# Patient Record
(demographics unavailable — no encounter records)

---

## 2024-10-15 NOTE — OB SUMMARY
[Date: _____] : Date: [unfilled] [Gestational Age: _____] : Gestational Age: [unfilled] [FreeTextEntry2] : siup at 38 wks  Patient complain  some itching on and off on hands and body no palms of hands and feet irregular ctx lft and bile salts fractionated rto 1 week  [FreeTextEntry1] : Pt is here for her 38 week OB check-up, pt has no complaints today.  wt- 166 ht- 5 '4 b/p- 106/69 pulse- 71 temp- 98.6 pro- neg glu- neg  [de-identified] : sm

## 2024-10-25 NOTE — DISCUSSION/SUMMARY
[FreeTextEntry1] : 23-year-old  1 para 0 at term. She feels well and the baby is active. BPP today 10/10

## 2024-10-29 NOTE — OB SUMMARY
[Date: _____] : Date: [unfilled] [Gestational Age: _____] : Gestational Age: [unfilled] [FreeTextEntry2] : siup at 40 wks  ve 1/long/high efw ant, no previa , vtx 6'15  16 % iol friday night [FreeTextEntry1] : Patient is here for follow/up pregnancy weight-  165 B/P-    125/78pulse-  65  urine , glucose-  protein-  negative  [de-identified] : go

## 2024-10-29 NOTE — OB SUMMARY
[Date: _____] : Date: [unfilled] [Gestational Age: _____] : Gestational Age: [unfilled] [FreeTextEntry2] : siup at 40 wks  ve 1/long/high efw ant, no previa , vtx 6'15  16 % iol friday night [FreeTextEntry1] : Patient is here for follow/up pregnancy weight-  165 B/P-    125/78pulse-  65  urine , glucose-  protein-  negative  [de-identified] : go COUGH

## 2024-12-12 NOTE — HISTORY OF PRESENT ILLNESS
[Last Pap Date: ___] : Last Pap Date: [unfilled] [Delivery Date: ___] : on [unfilled] [] : delivered by vaginal delivery [Male] : Delivery History: baby boy [Wt. ___] : weighing [unfilled] [Breastfeeding] : currently nursing [Postpartum Follow Up] : postpartum follow up [Complications:___] : complications include: [unfilled] [Fatigue] : fatigue [Back to Normal] : is back to normal in size [Normal] : the vagina was normal [Healing Well] : is healing well [Cervix Sample Taken] : cervical sample not taken for a Pap smear [Not Done] : Examination of breasts not done [Doing Well] : is doing well [None] : None [FreeTextEntry8] : s/p  , thrombocytopenia,, patient went back to the ED due to fever was diagnosed with urinary tract infection and given antibiotics, she followed up with hematologist outpatient platelets decreased to 18 went up to 80 and then again dropped to 48 this past week.  Patient stopped vaginal bleeding 2 days ago.  Complains of some dizziness, breast-feeding now, some pelvic pressure no back pain or dysuria [FreeTextEntry9] : Thrombocytopenia started slightly around 36 weeks [de-identified] : Dizziness, pelvic pressure [de-identified] : Normal external genitalia minimally tender at the perineum, no CMT no abnormal discharge no nontender nontender uterus negative adnexa [de-identified] : 23-year-old para 1 status post , thrombocytopenia with possible ITP, history of UTI pelvic pressure, anemia [de-identified] : Iron twice daily, urine culture, follow-up with hematologist on Monday, progesterone only OCP to start with consent of hematologist

## 2024-12-12 NOTE — HISTORY OF PRESENT ILLNESS
[Last Pap Date: ___] : Last Pap Date: [unfilled] [Delivery Date: ___] : on [unfilled] [] : delivered by vaginal delivery [Male] : Delivery History: baby boy [Wt. ___] : weighing [unfilled] [Breastfeeding] : currently nursing [Postpartum Follow Up] : postpartum follow up [Complications:___] : complications include: [unfilled] [Fatigue] : fatigue [Back to Normal] : is back to normal in size [Normal] : the vagina was normal [Healing Well] : is healing well [Cervix Sample Taken] : cervical sample not taken for a Pap smear [Not Done] : Examination of breasts not done [Doing Well] : is doing well [None] : None [FreeTextEntry8] : s/p  , thrombocytopenia,, patient went back to the ED due to fever was diagnosed with urinary tract infection and given antibiotics, she followed up with hematologist outpatient platelets decreased to 18 went up to 80 and then again dropped to 48 this past week.  Patient stopped vaginal bleeding 2 days ago.  Complains of some dizziness, breast-feeding now, some pelvic pressure no back pain or dysuria [FreeTextEntry9] : Thrombocytopenia started slightly around 36 weeks [de-identified] : Dizziness, pelvic pressure [de-identified] : Normal external genitalia minimally tender at the perineum, no CMT no abnormal discharge no nontender nontender uterus negative adnexa [de-identified] : 23-year-old para 1 status post , thrombocytopenia with possible ITP, history of UTI pelvic pressure, anemia [de-identified] : Iron twice daily, urine culture, follow-up with hematologist on Monday, progesterone only OCP to start with consent of hematologist

## 2025-06-24 NOTE — HISTORY OF PRESENT ILLNESS
[FreeTextEntry1] : 22 yo P-1 LMP-  Is here for annual exam ,  on birth control pills She states that she has been getting menses irregular o n ocp's  also has external vulvar pruritis  [TextBox_4] : no firboids cysts or sti

## 2025-06-24 NOTE — DISCUSSION/SUMMARY
[FreeTextEntry1] : 22 yo p1 annual gyn, ocp refill, vulvar pruritis pap hpv junel 1/20 - will increase dose to junel 1.5 /30  lotrisone topical